# Patient Record
Sex: FEMALE | Race: OTHER
[De-identification: names, ages, dates, MRNs, and addresses within clinical notes are randomized per-mention and may not be internally consistent; named-entity substitution may affect disease eponyms.]

---

## 2018-05-08 ENCOUNTER — HOSPITAL ENCOUNTER (INPATIENT)
Dept: HOSPITAL 80 - FED | Age: 48
LOS: 4 days | Discharge: HOME HEALTH | DRG: 964 | End: 2018-05-12
Attending: SURGERY | Admitting: SURGERY
Payer: COMMERCIAL

## 2018-05-08 DIAGNOSIS — S22.41XA: ICD-10-CM

## 2018-05-08 DIAGNOSIS — S22.080A: ICD-10-CM
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
## 2018-05-08 DIAGNOSIS — S27.0XXA: Primary | ICD-10-CM

## 2018-05-08 DIAGNOSIS — S40.811A: ICD-10-CM

## 2018-05-08 DIAGNOSIS — S42.192A: ICD-10-CM
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
## 2018-05-08 DIAGNOSIS — S32.19XA: ICD-10-CM

## 2018-05-08 DIAGNOSIS — S80.812A: ICD-10-CM
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
## 2018-05-08 DIAGNOSIS — S40.812A: ICD-10-CM

## 2018-05-08 DIAGNOSIS — T85.898A: ICD-10-CM

## 2018-05-08 DIAGNOSIS — S32.810A: ICD-10-CM

## 2018-05-08 DIAGNOSIS — S80.811A: ICD-10-CM

## 2018-05-08 LAB
INR PPP: 0.98 (ref 0.83–1.16)
PLATELET # BLD: 244 10^3/UL (ref 150–400)
PROTHROMBIN TIME: 13.2 SEC (ref 12–15)

## 2018-05-08 PROCEDURE — G0480 DRUG TEST DEF 1-7 CLASSES: HCPCS

## 2018-05-08 RX ADMIN — OXYCODONE HYDROCHLORIDE AND ACETAMINOPHEN PRN TAB: 5; 325 TABLET ORAL at 15:26

## 2018-05-08 RX ADMIN — OXYCODONE HYDROCHLORIDE AND ACETAMINOPHEN PRN TAB: 5; 325 TABLET ORAL at 23:38

## 2018-05-08 RX ADMIN — ONDANSETRON PRN MG: 2 SOLUTION INTRAMUSCULAR; INTRAVENOUS at 15:26

## 2018-05-08 RX ADMIN — ONDANSETRON PRN MG: 2 SOLUTION INTRAMUSCULAR; INTRAVENOUS at 21:05

## 2018-05-08 RX ADMIN — OXYCODONE HYDROCHLORIDE AND ACETAMINOPHEN PRN TAB: 5; 325 TABLET ORAL at 18:55
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
## 2018-05-09 RX ADMIN — ONDANSETRON PRN MG: 4 TABLET, ORALLY DISINTEGRATING ORAL at 13:21

## 2018-05-09 RX ADMIN — OXYCODONE HYDROCHLORIDE AND ACETAMINOPHEN PRN TAB: 5; 325 TABLET ORAL at 10:21

## 2018-05-09 RX ADMIN — OXYCODONE HYDROCHLORIDE AND ACETAMINOPHEN PRN TAB: 5; 325 TABLET ORAL at 03:43

## 2018-05-09 RX ADMIN — ONDANSETRON PRN MG: 4 TABLET, ORALLY DISINTEGRATING ORAL at 07:35

## 2018-05-09 RX ADMIN — OXYCODONE HYDROCHLORIDE AND ACETAMINOPHEN PRN TAB: 5; 325 TABLET ORAL at 15:44

## 2018-05-09 RX ADMIN — ONDANSETRON PRN MG: 4 TABLET, ORALLY DISINTEGRATING ORAL at 17:53
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
## 2018-05-09 NOTE — ASMTCMCOM
CM Note

 

CM Note                       

Notes:

As of now, no surgeries are scheduled for patient. She will follow up with plastic surgery as an 

outpatient, Neurosurgery has cleared her, and ortho recommends closed treatment of the pelvic ring 

injury with protected toe touch weight bearing. PT/OT will work with patient today. SLP has cleared 


her. An inpatient rehab consult has been ordered.



Case Management will follow for any potential discharge needs. 

 

Date Signed:  05/09/2018 12:20 PM

Electronically Signed By:Jenny Wood RN
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
## 2018-05-10 RX ADMIN — KETOROLAC TROMETHAMINE PRN MG: 30 INJECTION, SOLUTION INTRAMUSCULAR at 22:37

## 2018-05-10 RX ADMIN — KETOROLAC TROMETHAMINE PRN MG: 30 INJECTION, SOLUTION INTRAMUSCULAR at 14:45

## 2018-05-10 RX ADMIN — ACETAMINOPHEN PRN MG: 325 TABLET ORAL at 04:23

## 2018-05-10 RX ADMIN — ENOXAPARIN SODIUM SCH MG: 100 INJECTION SUBCUTANEOUS at 12:39

## 2018-05-10 RX ADMIN — ACETAMINOPHEN PRN MG: 325 TABLET ORAL at 00:08
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
## 2018-05-10 NOTE — ASMTCMCOM
CM Note

 

CM Note                       

Notes:

PT/OT were both recommending IPR yesterday but OT now recommending home w/HHC, PT will work w/pt 

later on today. IPR is following but pt feels she will be able to go home w/HHC since her  

works from home and will be there to help her. Pt also has 3 children at home.  If home w/HHC she 

would like to use [facility]; notified Nancy w/[facility] and they can accept. Will follow up w/PT after they 


see pt to see if they agree w/home w/HHC. 

 

 

Date Signed:  05/10/2018 11:25 AM

Electronically Signed By:Bianca Ramsey RN

## 2018-05-11 RX ADMIN — KETOROLAC TROMETHAMINE PRN MG: 30 INJECTION, SOLUTION INTRAMUSCULAR at 12:36

## 2018-05-11 RX ADMIN — ENOXAPARIN SODIUM SCH MG: 100 INJECTION SUBCUTANEOUS at 09:00

## 2018-05-11 RX ADMIN — DOCUSATE SODIUM AND SENNOSIDES SCH TAB: 50; 8.6 TABLET ORAL at 09:00

## 2018-05-11 RX ADMIN — KETOROLAC TROMETHAMINE PRN MG: 30 INJECTION, SOLUTION INTRAMUSCULAR at 18:39

## 2018-05-11 RX ADMIN — DOCUSATE SODIUM AND SENNOSIDES SCH TAB: 50; 8.6 TABLET ORAL at 22:00
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
## 2018-05-11 NOTE — ASMTCMCOM
CM Note

 

CM Note                       

Notes:

Chart reviewed. Patient has Pnuemothorax that is being monitored. The plan is for her to go home 

with [facility] at this point in time with [facility] when medically cleared.

Plan: Home with [facility] services

 

Date Signed:  05/11/2018 02:25 PM

Electronically Signed By:Mary Alice Pardo RN
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
## 2018-05-12 VITALS — SYSTOLIC BLOOD PRESSURE: 120 MMHG | DIASTOLIC BLOOD PRESSURE: 84 MMHG

## 2018-05-12 RX ADMIN — KETOROLAC TROMETHAMINE PRN MG: 30 INJECTION, SOLUTION INTRAMUSCULAR at 04:48

## 2018-05-12 RX ADMIN — ACETAMINOPHEN PRN MG: 325 TABLET ORAL at 15:34

## 2018-05-12 RX ADMIN — KETOROLAC TROMETHAMINE PRN MG: 30 INJECTION, SOLUTION INTRAMUSCULAR at 14:59

## 2018-05-12 RX ADMIN — ACETAMINOPHEN PRN MG: 325 TABLET ORAL at 09:35

## 2018-05-12 RX ADMIN — DOCUSATE SODIUM AND SENNOSIDES SCH TAB: 50; 8.6 TABLET ORAL at 09:33

## 2018-05-12 RX ADMIN — ENOXAPARIN SODIUM SCH MG: 100 INJECTION SUBCUTANEOUS at 09:34

## 2018-05-12 NOTE — PDIAF
- Diagnosis


Diagnosis: Pelvic fracture/scapula fx/pneumothorax


Code Status: Full Code





- Medication Management


Discharge Medications: 


 Medications to Continue on Transfer





Cetirizine [ZyrTEC 10 mg (*)] 10 mg PO DAILY PRN 05/08/18 [Last Taken 05/08/18]


Herbals/Supplements -Info Only 1 ea PO DAILY 05/08/18 [Last Taken Unknown]


Multivitamins [Multivitamin (*)] 1 each PO DAILY 05/08/18 [Last Taken 05/07/18]


Acetaminophen [Tylenol 325mg (*)] 325 - 650 mg PO Q4HRS PRN  tab 05/12/18 [Last 

Taken Unknown]


Ibuprofen 800 mg PO TIDMEAL PRN #60 tablet 05/12/18 [Last Taken Unknown]


oxyCODONE/APAP 5/325 [Percocet 5/325 (*)] 1 - 2 tab PO Q4HRS PRN #30 tab 05/12/ 18 [Last Taken Unknown]








Discharge Medications: Refer to the Discharge Home Medication list for PRN 

reason.





- Orders


Services needed: Home Care, Registered Nurse


Home Care Face to Face: I certify that this patient was under my care and that 

I had the required face-to-face encounter meeting the encounter requirements on 

the discharge day.  My findings support the fact that the patient is homebound 

as defined in


Home Care Face to Face Continued: CMS Chapter 7 Medicare Benefits Manual 30.1.1

, The condition of the patient is such that there exists a normal inability to 

leave home and consequently, leaving home would require a considerable and 

taxing effort.


Diet Recommendation: no restrictions on diet


Diet Texture: Regular Texture Diet


Additional Instructions: 


Per NeuroSurgery (Dr. Jeffrey's team), ok to not wear the [place] Brace.  Please 

follow up with Dr. Jeffrey's office in 4 weeks.  Please call to make your 

appointment.





Per Ortho (Dr. White), continue weight bearing as tolerated on LUE and TTWB on 

LLE with walker.  Please follow up with Dr. White in his office at [facility] in 2 weeks.  Please call to make your appointment.





- Follow Up Care


Current Providers and Referrals: 


Ja Marshall MD [Medical Doctor] - follow up in 1 week (call [phone] to 

schedule- CXR prior)


Chance Rand MD [Medical Doctor] - 


SABINO Jeffrey MD [Medical Doctor] - 


Patient,NotPresent [Primary Care Provider] - As per Instructions


Salvador White MD [Medical Doctor] -

## 2018-05-12 NOTE — ASMTLACE
LACE

 

Length of stay for            Answers:  4-6 days                              

current admission                                                             

Acuity / Level of             Answers:  Yes                                   

Care: Did the patient                                                         

have an inpatient                                                             

admission?                                                                    

# of Emergency department     Answers:  1-2                                   

visits in the last 6                                                          

months                                                                        

Social determinants           Answers:  Mental health diagnosis               

                                        (anxiety, depression, pers            

                                        onality disorders, etc.)              

Score: 11

 

Date Signed:  05/12/2018 04:29 PM

Electronically Signed By:Iva Riggins RN
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
## 2018-05-16 ENCOUNTER — HOSPITAL ENCOUNTER (OUTPATIENT)
Dept: HOSPITAL 80 - FIMAGING | Age: 48
End: 2018-05-16
Attending: SURGERY
Payer: COMMERCIAL

## 2018-05-16 DIAGNOSIS — S22.42XD: ICD-10-CM

## 2018-05-16 DIAGNOSIS — J94.2: ICD-10-CM

## 2018-05-16 DIAGNOSIS — J98.11: ICD-10-CM

## 2018-05-16 DIAGNOSIS — J93.9: Primary | ICD-10-CM
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
## 2018-05-23 ENCOUNTER — HOSPITAL ENCOUNTER (OUTPATIENT)
Dept: HOSPITAL 80 - BMCIMAGING | Age: 48
End: 2018-05-23
Attending: ORTHOPAEDIC SURGERY
Payer: COMMERCIAL

## 2018-05-23 DIAGNOSIS — S32.512D: Primary | ICD-10-CM

## 2018-05-23 DIAGNOSIS — S32.111D: ICD-10-CM

## 2018-05-29 ENCOUNTER — HOSPITAL ENCOUNTER (OUTPATIENT)
Dept: HOSPITAL 80 - FIMAGING | Age: 48
End: 2018-05-29
Attending: SURGERY
Payer: COMMERCIAL

## 2018-05-29 DIAGNOSIS — Z87.09: ICD-10-CM

## 2018-05-29 DIAGNOSIS — Z09: Primary | ICD-10-CM

## 2018-05-30 ENCOUNTER — HOSPITAL ENCOUNTER (OUTPATIENT)
Dept: HOSPITAL 80 - FSGY | Age: 48
Discharge: HOME | End: 2018-05-30
Attending: PLASTIC SURGERY
Payer: COMMERCIAL

## 2018-05-30 VITALS — DIASTOLIC BLOOD PRESSURE: 66 MMHG | SYSTOLIC BLOOD PRESSURE: 103 MMHG

## 2018-05-30 DIAGNOSIS — S42.192D: ICD-10-CM
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
## 2018-05-30 DIAGNOSIS — T85.49XA: Primary | ICD-10-CM
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
## 2018-05-30 DIAGNOSIS — S22.42XD: ICD-10-CM

## 2018-05-30 DIAGNOSIS — S32.509D: ICD-10-CM
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
## 2018-05-30 DIAGNOSIS — S27.0XXD: ICD-10-CM

## 2018-05-30 PROCEDURE — 0HPT0JZ REMOVAL OF SYNTHETIC SUBSTITUTE FROM RIGHT BREAST, OPEN APPROACH: ICD-10-PCS | Performed by: PLASTIC SURGERY

## 2018-05-30 PROCEDURE — 0HCV0ZZ EXTIRPATION OF MATTER FROM BILATERAL BREAST, OPEN APPROACH: ICD-10-PCS | Performed by: PLASTIC SURGERY

## 2018-05-30 PROCEDURE — 0H0V0JZ ALTERATION OF BILATERAL BREAST WITH SYNTHETIC SUBSTITUTE, OPEN APPROACH: ICD-10-PCS | Performed by: PLASTIC SURGERY

## 2018-05-30 PROCEDURE — 0HPU0JZ REMOVAL OF SYNTHETIC SUBSTITUTE FROM LEFT BREAST, OPEN APPROACH: ICD-10-PCS | Performed by: PLASTIC SURGERY

## 2018-05-30 NOTE — POSTOPPROG
Post Op Note


Date of Operation: 05/30/18


Surgeon: Chance Rand


Assistant: Ladarius VILLA


Anesthesia: LMA


Pre-op Diagnosis: ruptured left implant


Post-op Diagnosis: Same


Inf/Abcess present in the surg proc area at time of surgery?: No


EBL: Minimal

## 2018-05-30 NOTE — PDANEPAE
ANE History of Present Illness





Left breast implant rupture Cosmetic





ANE Past Medical History





- Cardiovascular History


Hx Hypertension: No


Hx Arrhythmias: No


Hx Chest Pain: No


Hx Coronary Artery / Peripheral Vascular Disease: No


Hx CHF / Valvular Disease: No


Hx Palpitations: No


Cardiovascular History Comment: BP AND HR RUN LOW





- Pulmonary History


Hx COPD: No


Hx Asthma/Reactive Airway Disease: No


Hx Recent Upper Respiratory Infection: No


Hx Oxygen in Use at Home: No


Hx Sleep Apnea: No


Sleep Apnea Screening Result - Last Documented: Negative


Pulmonary History Comment: CURRENT PNEUMOTHORAX- HAS BEEN CLEARED FOR SURGERY





- Neurologic History


Hx Cerebrovascular Accident: No


Hx Seizures: No


Hx Dementia: No





- Endocrine History


Hx Diabetes: No


Endocrine History Comment: MINOR HYPOTHYROIDISM- NEVER STABILIZED WITH 

LEVOTHYROXINE SO SHE HASN'T TAKEN ANY MEDICATIONS IN YEARS





- Renal History


Hx Renal Disorders: No





- Liver History


Hx Hepatic Disorders: No





- Neurological & Psychiatric Hx


Hx Neurological and Psychiatric Disorders: No





- Cancer History


Hx Cancer: No





- Congenital Disorder History


Hx Congenital Disorders: No





- GI History


Hx Gastrointestinal Disorders: No


Gastrointestinal History Comment: CONSTIPATION WITH NARCOTICS





- Other Health History


Other Health History: WEARS GLASSES/ CONTACTS





- Chronic Pain History


Chronic Pain: No





- Surgical History


Prior Surgeries: HERNIA REPAIR.  BREAST AUGMENTATION.  EYE SURGERY BEFORE LASIK 

SURG





ANE Review of Systems


Review of Systems: 








- Exercise capacity


METS (RN): 5 METS





ANE Patient History





- Allergies


Allergies/Adverse Reactions: 








hydrocodone Allergy (Unverified 05/30/18 11:58)


 makes me throw up


oxycodone Allergy (Verified 05/30/18 11:58)


 makes me throw up








- Home Medications


Home medications: home medication list seen and reviewed


Home Medications: 








Acetaminophen [Tylenol 325mg (*)] PRN 05/25/18 [Last Taken 05/30/18]


Herbals/Supplements -Info Only  05/25/18 [Last Taken 05/25/18]


Ibuprofen PRN 05/25/18 [Last Taken 05/30/18]


ZyrTEC 10 mg (*)  05/25/18 [Last Taken Unknown]








- NPO status


NPO Since - Liquids (Date): 05/30/18


NPO Since - Liquids (Time): 09:00


NPO Since - Solids (Date): 05/30/18


NPO Since - Solids (Time): 05:00





- Anes Hx


Anes Hx: no prior problems





- Smoking Hx


Smoking Status: Never smoked





- Family Anes Hx


Family Hx Anesthesia Complications: NONE





ANE Labs/Vital Signs





- Vital Signs


Blood Pressure: 105/69


Heart Rate: 62


Respiratory Rate: 16


O2 Sat (%): 94


Height: 154 cm


Weight: 58.967 kg





ANE Physical Exam





- Airway


Neck exam: FROM


Mallampati Score: Class 1


Mouth exam: normal dental/mouth exam





- Pulmonary


Pulmonary: no respiratory distress





- Cardiovascular


Cardiovascular: regular rate and rhythym





- ASA Status


ASA Status: I





ANE Anesthesia Plan


Anesthesia Plan: GA w LMA

## 2018-05-30 NOTE — PDHPUP
History & Physical Update


H&P update statement: 


This history and physical update is based on an assessment of the patient which 

was completed after admission or registration (within 24 hours), but prior to 

the surgery/procedure.





H&P update: no change in patient's condition since H&P completed (no residual 

pneumothorax seen on CXR from patient's recent trauma)
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
## 2018-06-04 ENCOUNTER — HOSPITAL ENCOUNTER (OUTPATIENT)
Dept: HOSPITAL 80 - BMCIMAGING | Age: 48
End: 2018-06-04
Attending: ORTHOPAEDIC SURGERY
Payer: COMMERCIAL

## 2018-06-04 ENCOUNTER — HOSPITAL ENCOUNTER (OUTPATIENT)
Dept: HOSPITAL 80 - FIMAGING | Age: 48
End: 2018-06-04
Attending: PHYSICIAN ASSISTANT
Payer: COMMERCIAL
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
## 2018-06-04 DIAGNOSIS — R93.8: ICD-10-CM

## 2018-06-04 DIAGNOSIS — S22.080D: ICD-10-CM

## 2018-06-04 DIAGNOSIS — S32.810D: Primary | ICD-10-CM

## 2018-06-04 DIAGNOSIS — M54.6: Primary | ICD-10-CM

## 2018-06-18 ENCOUNTER — HOSPITAL ENCOUNTER (OUTPATIENT)
Dept: HOSPITAL 80 - BMCIMAGING | Age: 48
End: 2018-06-18
Attending: ORTHOPAEDIC SURGERY
Payer: COMMERCIAL

## 2018-06-18 DIAGNOSIS — S32.810D: Primary | ICD-10-CM

## 2018-07-02 ENCOUNTER — HOSPITAL ENCOUNTER (OUTPATIENT)
Dept: HOSPITAL 80 - BMCIMAGING | Age: 48
End: 2018-07-02
Attending: ORTHOPAEDIC SURGERY
Payer: COMMERCIAL

## 2018-07-02 DIAGNOSIS — S32.592D: Primary | ICD-10-CM

## 2018-07-16 ENCOUNTER — HOSPITAL ENCOUNTER (OUTPATIENT)
Dept: HOSPITAL 80 - BMCIMAGING | Age: 48
End: 2018-07-16
Attending: ORTHOPAEDIC SURGERY
Payer: COMMERCIAL

## 2018-07-16 DIAGNOSIS — S32.810A: Primary | ICD-10-CM

## 2018-08-17 ENCOUNTER — HOSPITAL ENCOUNTER (OUTPATIENT)
Dept: HOSPITAL 80 - BMCIMAGING | Age: 48
End: 2018-08-17
Attending: ORTHOPAEDIC SURGERY
Payer: COMMERCIAL

## 2018-08-17 DIAGNOSIS — S32.810D: Primary | ICD-10-CM

## 2018-08-17 DIAGNOSIS — M53.86: ICD-10-CM

## 2018-08-17 DIAGNOSIS — S22.080D: ICD-10-CM
